# Patient Record
(demographics unavailable — no encounter records)

---

## 2024-10-28 NOTE — PHYSICAL EXAM
[Tired appearing] : tired appearing [Clear Rhinorrhea] : clear rhinorrhea [Erythematous Oropharynx] : erythematous oropharynx [Enlarged] : enlarged [Submandibular] : submandibular [NL] : warm, clear [Acute Distress] : no acute distress [Conjuctival Injection] : no conjunctival injection [Discharge] : no discharge [Erythema] : no erythema [Bulging] : not bulging [Vesicles] : no vesicles [Exudate] : no exudate [Ulcerative Lesions] : no ulcerative lesions [Palate petechiae] : palate without petechiae [Wheezing] : no wheezing [Rales] : no rales [Crackles] : no crackles [Tachypnea] : no tachypnea [Rhonchi] : no rhonchi

## 2024-10-28 NOTE — DISCUSSION/SUMMARY
[FreeTextEntry1] : Symptomatic treatment of fever and/or pain discussed Stat strep test ordered Throat culture, if POSITIVE, give Amoxicillin 750 mg BID x 10 days Covid test done Flu test done Hydrate well Handwashing and infection control discussed Return to office if febrile > 48 hours or if symptoms get worse Go to ER if unable to come to the office or during after hours, parent encouraged to call service first before doing so. Recheck prn

## 2024-10-28 NOTE — HISTORY OF PRESENT ILLNESS
[de-identified] : FEVER X SATURDAY. COUGH X YESTERDAY [FreeTextEntry6] : Sore throat x 2 days Fever x 2 days Cough and runny nose x 2 days No ear pain No wheezing or dyspnea Normal appetite, No vomiting, No diarrhea Body aches and fatigue No smell or taste issues No recent sick contacts No recent Covid contacts or exposure No recent travel or contact with travelers

## 2024-10-28 NOTE — REVIEW OF SYSTEMS
[Fever] : fever [Malaise] : malaise [Nasal Discharge] : nasal discharge [Nasal Congestion] : nasal congestion [Sore Throat] : sore throat [Cough] : cough [Negative] : Genitourinary [Headache] : no headache [Cyanosis] : no cyanosis [Tachypnea] : not tachypneic [Wheezing] : no wheezing [Vomiting] : no vomiting [Diarrhea] : no diarrhea

## 2024-10-30 NOTE — HISTORY OF PRESENT ILLNESS
[de-identified] : fever x 5 days, low grade today, nausea  [FreeTextEntry6] : Fever x 5 days Cough and runny nose x 5 days Very tired per mom No ear pain No sore throat No wheezing or dyspnea Normal appetite, No vomiting, No diarrhea No smell or taste issues No recent sick contacts No recent Covid contacts or exposure No recent travel or contact with travelers

## 2024-10-30 NOTE — DISCUSSION/SUMMARY
[FreeTextEntry1] : Start medication(s) as prescribed Symptomatic treatment for fever Maintain adequate hydration  Stressed handwashing and infection control  Pay close observation for new or worsening symptoms Instructed to return to office if condition worsens or new symptoms arise Go to ER or UC if condition worsens or unable to to get to the office or after office hours Recheck as needed

## 2024-10-30 NOTE — PHYSICAL EXAM
[Tired appearing] : tired appearing [Clear Rhinorrhea] : clear rhinorrhea [Crackles] : crackles [Enlarged] : enlarged [Submandibular] : submandibular [NL] : warm, clear [Acute Distress] : no acute distress [Conjuctival Injection] : no conjunctival injection [Discharge] : no discharge [Erythema] : no erythema [Bulging] : not bulging [Vesicles] : no vesicles [Exudate] : no exudate [Ulcerative Lesions] : no ulcerative lesions [Palate petechiae] : palate without petechiae [Wheezing] : no wheezing [Tachypnea] : no tachypnea

## 2024-11-04 NOTE — PHYSICAL EXAM
[Acute Distress] : no acute distress [Tired appearing] : not tired appearing [Conjuctival Injection] : no conjunctival injection [Discharge] : no discharge [Erythema] : no erythema [Bulging] : not bulging [Clear Rhinorrhea] : clear rhinorrhea [Vesicles] : no vesicles [Exudate] : no exudate [Ulcerative Lesions] : no ulcerative lesions [Palate petechiae] : palate without petechiae [Clear to Auscultation Bilaterally] : clear to auscultation bilaterally [Wheezing] : no wheezing [Rales] : no rales [Crackles] : no crackles [Transmitted Upper Airway Sounds] : no transmitted upper airway sounds [Tachypnea] : no tachypnea [Rhonchi] : no rhonchi [Tenderness with Palpation] : no tenderness with palpation [Enlarged] : enlarged [Submandibular] : submandibular [NL] : warm, clear

## 2024-11-04 NOTE — REVIEW OF SYSTEMS
[Fever] : no fever [Malaise] : no malaise [Ear Pain] : no ear pain [Nasal Discharge] : no nasal discharge [Nasal Congestion] : nasal congestion [Sore Throat] : no sore throat [Cyanosis] : no cyanosis [Tachypnea] : not tachypneic [Wheezing] : no wheezing [Cough] : cough [Vomiting] : no vomiting [Diarrhea] : no diarrhea [Negative] : Genitourinary

## 2024-11-04 NOTE — DISCUSSION/SUMMARY
[FreeTextEntry1] : Avoid dairy today Rest Discussed possibility of new infection Maintain adequate hydration  Stressed handwashing and infection control  Pay close observation for new or worsening symptoms Instructed to return to office if condition worsens or new symptoms arise Go to ER or UC if condition worsens or unable to to get to the office or after office hours Recheck as needed

## 2024-11-04 NOTE — HISTORY OF PRESENT ILLNESS
[de-identified] : Lung recheck [FreeTextEntry6] : Finished abx prescribed at last visit, feeling better. Some SOB when running around.  No fever x 5 days Occasional cough, but lots better now [place] nauseous this AM, no V/D Appetite good Ran around in gym today, got tired after heavy activity

## 2025-03-10 NOTE — DISCUSSION/SUMMARY
[Normal Growth] : growth [Normal Development] : development [None] : No known medical problems [No Elimination Concerns] : elimination [No Feeding Concerns] : feeding [No Skin Concerns] : skin [Normal Sleep Pattern] : sleep [School] : school [Development and Mental Health] : development and mental health [Nutrition and Physical Activity] : nutrition and physical activity [Oral Health] : oral health [Safety] : safety [No Medications] : ~He/She~ is not on any medications [Patient] : patient [Full Activity without restrictions including Physical Education & Athletics] : Full Activity without restrictions including Physical Education & Athletics [FreeTextEntry1] : Continue balanced diet with all food groups. Brush teeth twice a day with toothbrush. Recommend visit to dentist. Help child to maintain consistent daily routines and sleep schedule. School discussed. Ensure home is safe. Teach child about personal safety. Use consistent, positive discipline. Limit screen time to no more than 2 hours per day. Encourage physical activity. Child needs to ride in a belt-positioning booster seat until  4 feet 9 inches has been reached and are between 8 and 12 years of age.   Return 1 year for routine well child check. Cardiac questionnaire reviewed, NO issues. 5-2-1-0 questionnaire reviewed, parent(s) have no issues or concerns. Discussed in the preferred language of English Flu shot deferred

## 2025-03-10 NOTE — PHYSICAL EXAM
[Alert] : alert [No Acute Distress] : no acute distress [Normocephalic] : normocephalic [Conjunctivae with no discharge] : conjunctivae with no discharge [PERRL] : PERRL [EOMI Bilateral] : EOMI bilateral [Auricles Well Formed] : auricles well formed [Clear Tympanic membranes with present light reflex and bony landmarks] : clear tympanic membranes with present light reflex and bony landmarks [No Discharge] : no discharge [Nares Patent] : nares patent [Pink Nasal Mucosa] : pink nasal mucosa [Palate Intact] : palate intact [Nonerythematous Oropharynx] : nonerythematous oropharynx [Supple, full passive range of motion] : supple, full passive range of motion [No Palpable Masses] : no palpable masses [Symmetric Chest Rise] : symmetric chest rise [Clear to Auscultation Bilaterally] : clear to auscultation bilaterally [Regular Rate and Rhythm] : regular rate and rhythm [Normal S1, S2 present] : normal S1, S2 present [No Murmurs] : no murmurs [Soft] : soft [NonTender] : non tender [Non Distended] : non distended [No Hepatomegaly] : no hepatomegaly [No Splenomegaly] : no splenomegaly [Carlos: _____] : Carlos [unfilled] [No Abnormal Lymph Nodes Palpated] : no abnormal lymph nodes palpated [No Gait Asymmetry] : no gait asymmetry [No pain or deformities with palpation of bone, muscles, joints] : no pain or deformities with palpation of bone, muscles, joints [Normal Muscle Tone] : normal muscle tone [Straight] : straight [No Scoliosis] : no scoliosis [+2 Patella DTR] : +2 patella DTR [Cranial Nerves Grossly Intact] : cranial nerves grossly intact [No Rash or Lesions] : no rash or lesions [Carlos: ____] : Carlos [unfilled] [FreeTextEntry6] : normal external genitalia, + axillary hair

## 2025-03-10 NOTE — HISTORY OF PRESENT ILLNESS
[Eats healthy meals and snacks] : eats healthy meals and snacks [Normal] : Normal [Brushing teeth twice/d] : brushing teeth twice per day [Yes] : Patient goes to dentist yearly [Toothpaste] : Primary Fluoride Source: Toothpaste [Premenarche] : premenarche [Grade ___] : Grade [unfilled] [Adequate performance] : adequate performance [No] : No cigarette smoke exposure [Up to date] : Up to date [Mother] : mother [FreeTextEntry7] : 9 yr well child [FreeTextEntry9] : dance

## 2025-06-18 NOTE — HISTORY OF PRESENT ILLNESS
[de-identified] : Fell in bathroom last night unwitnessed. Unknown LOC. Small abrasion on right eye lid. Today complaining of Headache, nusea, and light senitivity.   [FreeTextEntry6] : Found crying on bathroom floor last night by dad Woke up to urinate, patient remebers sitting on toilet then her head between toilet and wall, started crying No vomiting but felt nauseous and had a headache when she went to sleep Woke up this morning with headache, nausea and feeling tired, had some light sensitivity Still has headache per patient, less nauseous Reviewed head injury questionnaire with mom and patient NO Vomiting, no Dizziness, no Balance Issues, no Weakness, no Irritability Not Feeling foggy, no Concentration problems, no Numbness, no Tingling No Vision changes, no Noise Sensitivity No Memory problems, not Feeling slow, no Insomnia, no Drowsiness No fever No ear pain, No nasal congestion, no sore throat No cough, No wheezing Normal appetite, No vomiting, No diarrhea

## 2025-06-18 NOTE — PHYSICAL EXAM
[Tired appearing] : tired appearing [Tenderness] : tenderness [Swelling] : swelling [NL] : warm, clear [Acute Distress] : no acute distress [Lethargic] : not lethargic [Laceration] : no laceration [Ecchymosis] : no ecchymosis [Conjuctival Injection] : no conjunctival injection [Discharge] : no discharge [Eyelid Swelling] : no eyelid swelling [Erythema] : no erythema [Bulging] : not bulging [FreeTextEntry5] : ADRIAN, nondilated fundus WNL [de-identified] : nonfocal exam, no nystagmus, negative romberg

## 2025-06-18 NOTE — DISCUSSION/SUMMARY
[FreeTextEntry1] : Reduce physical activity, avoid strenuous activity No gym or sports until cleared Discussed night time sleep routine and getting adequate sleep Minimize or avoid use of electronic media including computers, phones and tablets Use analgesics prn Cool compress to right orbital area Ensure a balanced diet and adequate hydration Avoid aggravating factors including loud sounds, bright lights, stress, etc. Rest as much as possible Pay close observation for new or worsening symptoms Instructed to return to office if condition worsens or new symptoms arise Go to ER or UC if condition worsens or unable to to get to the office or after office hours  Follow up 4-5 days, earlier if worse Concussion info sheet given for reference Note for school given to mom

## 2025-06-23 NOTE — DISCUSSION/SUMMARY
[FreeTextEntry1] : Continue present care Reduce physical activity, avoid strenuous activity No gym or sports until cleared Discussed night time sleep routine and getting adequate sleep Minimize or avoid use of electronic media including computers, phones and tablets Use analgesics prn Cool compress to right orbital area Ensure a balanced diet and adequate hydration Avoid aggravating factors including loud sounds, bright lights, stress, etc. Rest as much as possible Pay close observation for new or worsening symptoms Instructed to return to office if condition worsens or new symptoms arise Go to ER or UC if condition worsens or unable to to get to the office or after office hours  Follow up 4-5 days, earlier if worse Reviewed concussion questionnaire and protocol 30 mins

## 2025-06-23 NOTE — PHYSICAL EXAM
[Tired appearing] : tired appearing [NL] : warm, clear [Acute Distress] : no acute distress [Lethargic] : not lethargic [Tenderness] : no tenderness [Laceration] : no laceration [Ecchymosis] : no ecchymosis [Swelling] : no swelling [Conjuctival Injection] : no conjunctival injection [Discharge] : no discharge [Eyelid Swelling] : no eyelid swelling [Erythema] : no erythema [Bulging] : not bulging [FreeTextEntry5] : ADRIAN, nondilated fundus WNL, no bruising on right orbital area [de-identified] : nonfocal exam, no nystagmus, negative romberg

## 2025-06-23 NOTE — HISTORY OF PRESENT ILLNESS
[de-identified] : f/u head injury [FreeTextEntry6] : fell on bathroom tile on 6/18/25, still having headaches  Headaches significantly less than last week, only took ibuprofen once, 2 days ago No Nausea, no Vomiting, no Dizziness, no Balance Issues, no Weakness, no Irritability Not Feeling foggy, no Concentration problems, no Numbness, no Tingling No Vision changes, no Light Sensitivity, no Noise Sensitivity No Memory problems, not Feeling slow, no Insomnia, no Drowsiness More active now and back to normal per mom Had no issues with dance practice over the weekend No fever No ear pain, No nasal congestion, no sore throat No cough, No wheezing Normal appetite, No vomiting, No diarrhea

## 2025-06-28 NOTE — PHYSICAL EXAM
[NL] : warm, clear [Tenderness] : no tenderness [Swelling] : no swelling [Conjuctival Injection] : no conjunctival injection [Discharge] : no discharge [Erythema] : no erythema [Bulging] : not bulging [FreeTextEntry5] : ADRIAN, nondilated fundus WNL, no papilledema [de-identified] : negative romberg closed eyes

## 2025-06-28 NOTE — DISCUSSION/SUMMARY
[FreeTextEntry1] : Send to neurology for persistent HA and other symptoms Reduce physical activity, avoid strenuous activity No gym or sports until cleared Discussed night time sleep routine and getting adequate sleep Minimize or avoid use of electronic media including computers, phones and tablets Use analgesics prn Ensure a balanced diet and adequate hydration Avoid aggravating factors including loud sounds, bright lights, stress, etc. Rest as much as possible Pay close observation for new or worsening symptoms Instructed to return to office if condition worsens or new symptoms arise Go to ER or UC if condition worsens or unable to to get to the office or after office hours  Follow up 4-5 days, earlier if worse Reviewed concussion questionnaire and protocol 30 mins

## 2025-06-28 NOTE — HISTORY OF PRESENT ILLNESS
[de-identified] : Head injury follow up [FreeTextEntry6] : Does not complain of HA until mom asks her, sometimes she does, sometimes not per mom Was at moving up ceremony for sibling and the clapping bothered her  Bright lights bother her on and off Symptoms were better last visit but seem to persist or come back  Sleeping well Same activity, went to dance and had no problems, advised mom to limit strenuous activity No fever No ear pain, No nasal congestion, no sore throat No cough, No wheezing Normal appetite, No vomiting, No diarrhea

## 2025-07-02 NOTE — HISTORY OF PRESENT ILLNESS
[FreeTextEntry1] : Reason for Visit: Syncope episode with a head injury on June 18, 2025, followed by minor headaches and photophobia with possible concussion.    History of present illness Ca is a 10-year-old female.   Syncope and Head Injury On June 18, 2025, at approximately 4:44 AM, Lauryn woke up at home and went to the bathroom. While sitting on the toilet, she experienced a loss of consciousness or possible sleep episode, after which she was found with her head stuck between the toilet and the shower. She was able to get up by herself after regaining consciousness. There was no report of tongue biting or bite marks or bowel incontinence. Upon being found by her parents, she was sitting on the floor, dressed, and crying, but was able to return to bed. The next morning, a gash was noted on her eye, which resolved within two days and was described as similar to a rug burn. Ca reported feeling dizzy upon regaining consciousness. She did not report pushing hard to urinate and described urination as normal. There was no report of paleness at the time of the event, though the mother was unable to see clearly due to not wearing contacts. Ca did not recall if she got up from the toilet before the fall. There was no report of prior similar episodes, no history of breath-holding spells in infancy, and no family history of seizures. There was no report of tongue biting, abnormal eye movements, or unresponsiveness during the episode. Ca appeared angry and hostile after the event.   Headache and Post-Concussive Symptoms On the day of the event, Ca developed a headache and reported sensitivity to light. She was kept home from school and later that day an appointment was made due to these symptoms. She continued to have minor headaches for several days following the event, with the last reported headache occurring during a movie ceremony the following Tuesday (June 24, 2025). Headaches were described as being located in the front of the head, with no specific description of the quality other than "it just hurts." She reported that lights and noises were bothersome initially, but at the time of the encounter, only found the exam room light "annoying." There was no report of profuse vomiting. Lauryn had not complained of headaches prior to this incident.   Activity, Lifestyle, Eating, Sleeping, and Hydration Following the event, Ca refrained from dance and other strenuous activities for 3-4 days. She resumed dance rehearsals the week prior to the encounter and did not complain of symptoms during activity. She was able to participate in activities at home and at dance without limitation. Ca is active in competition dance and is preparing for a national competition in [place]. She was reported to be eating and drinking well, and her mother ensured she stayed hydrated, especially in hot weather.   Family History of Headache There is a family history of headaches and migraines in Lauryn's uncle, aunt, and cousin, but not in immediate family members.   Family history - No family history of seizures - Family history of migraines (brother, sister, nephew)   Social history - Practices competition dance - Attends school; was still in school at the time of the encounter and was preparing for summer break. She was described as being ready for school the morning after the event and has continued to participate in school activities. Grade not specified, but she is 10 years old and appears to be doing well in her activities.

## 2025-07-02 NOTE — CONSULT LETTER
[Dear  ___] : Dear  [unfilled], [Consult Letter:] : I had the pleasure of evaluating your patient, [unfilled]. [Please see my note below.] : Please see my note below. [Consult Closing:] : Thank you very much for allowing me to participate in the care of this patient.  If you have any questions, please do not hesitate to contact me. [Sincerely,] : Sincerely, [FreeTextEntry3] : Susanne Burr MD Medical Director, Pediatric Concussion Program  , [employer] [facility] Department of Pediatric Neurology [facility]  [address], [address] Tel: [phone] Fax: [phone]

## 2025-07-02 NOTE — ASSESSMENT
[FreeTextEntry1] : Assessment - Possible vasovagal syncope, likely related to micturition (voiding syncope) due to changes in blood pressure during urination. - Possible concussion - No immediate concern for seizures, but further observation is advised if symptoms recur.

## 2025-07-02 NOTE — PLAN
[FreeTextEntry1] : Plan - Observe Ca's behavior and activities rather than frequently asking about symptoms -Cleared to participate in all dance activities, including the upcoming competition in [place]. Monitor her closely during these activities and allow her to take breaks if she appears unwell. - Watch for any red flags such as episodes of shaking, inability to respond to touch, or abnormal eye movements. If any of these occur, contact the doctor immediately. - Ensure Ca stays well-hydrated, particularly during dance activities and in hot weather, to prevent any recurrence of syncope. - A follow-up appointment is scheduled for the end of August to reassess Lauryn's condition and ensure no further episodes have occurred. - If the syncope episode recurs before the scheduled follow-up, contact the doctor immediately to discuss the possibility of conducting an EEG to evaluate brain function.